# Patient Record
(demographics unavailable — no encounter records)

---

## 2025-01-02 NOTE — HISTORY OF PRESENT ILLNESS
[de-identified] : 62M with chronic R shoulder pain and neck pain presents for evaluation of rotator cuff tear. Pt is LHD but notes his R shoulder has been hurting him much more severely over the past year. Symptoms have been ongoing for over 3 years. He did PT for his shoulder last year but notes it did not help that much. MRI From 2023 showing partial thickness supraspinatus tear.

## 2025-01-02 NOTE — ASSESSMENT
[FreeTextEntry1] : 62M with R Rotator Cuff Tear, failed conservative management - Indicated for surgical fixation of rotator cuff tear - Risks and benefits of surgery discussed, including residual pain and stiffness as well as infection; pt agreed and willing to proceed with operation - Preoperative medical and cardiac clearance - Repeat MRI as his current MRI is > 1 year old - F/u with surgical coordinator; tenatively booked for Friday 1/17/25  All medical record entries made by "Selena Hammer" acting as a scribe for this encounter under the direction of "Dr. Serrato." I have reviewed the chart and agree that the record accurately reflects my personal performance of the history, physical exam, assessment and plan. I have also personally directed, reviewed and agreed with the chart.

## 2025-01-02 NOTE — PHYSICAL EXAM
[de-identified] : R Shoulder Full passive and AROM but with pain and terminal motion +Castillo impingement sign +Drop can test Neg load shift Neg speeds Neg Yeargasons 4/5 weakness with subscapularis and supraspinatus [de-identified] : MRI from 2023 showing partial thickness supraspinatus and infraspinatus tear with large subacromial osteophyte

## 2025-02-07 NOTE — HISTORY OF PRESENT ILLNESS
[Chills] : no chills [Constipation] : no constipation [Diarrhea] : no diarrhea [Dysuria] : no dysuria [Fever] : no fever [Nausea] : no nausea [Vomiting] : no vomiting [de-identified] : s/p Right shoulder arthroscopy with RCR and biceps tenodesis. (1/24/25) [de-identified] : 62 year old male s/p Right shoulder arthroscopy with RCR (1/24/25). Pain controlled with medication. Accompanied by wife.   [de-identified] : RUE: sutures removed, incisions well healed SILT m/r/u +motor EPL/FPL/EDC/FDP/IO WWP distally [de-identified] : 62 year old male s/p Right shoulder arthroscopy with RCR (1/24/25).  [de-identified] : Patient progressing well.  Discussed post-op restrictions. Given HEP.  The patient was given a referral for physical therapy.  F/U 4 weeks. We discussed red flag symptoms that would require emergent evaluation. He knows to call with any questions or concerns or if his symptoms acutely worsen.

## 2025-02-07 NOTE — HISTORY OF PRESENT ILLNESS
[Chills] : no chills [Constipation] : no constipation [Diarrhea] : no diarrhea [Dysuria] : no dysuria [Fever] : no fever [Nausea] : no nausea [Vomiting] : no vomiting [de-identified] : s/p Right shoulder arthroscopy with RCR and biceps tenodesis. (1/24/25) [de-identified] : 62 year old male s/p Right shoulder arthroscopy with RCR (1/24/25). Pain controlled with medication. Accompanied by wife.   [de-identified] : RUE: sutures removed, incisions well healed SILT m/r/u +motor EPL/FPL/EDC/FDP/IO WWP distally [de-identified] : 62 year old male s/p Right shoulder arthroscopy with RCR (1/24/25).  [de-identified] : Patient progressing well.  Discussed post-op restrictions. Given HEP.  The patient was given a referral for physical therapy.  F/U 4 weeks. We discussed red flag symptoms that would require emergent evaluation. He knows to call with any questions or concerns or if his symptoms acutely worsen.

## 2025-03-07 NOTE — HISTORY OF PRESENT ILLNESS
[___ Weeks Post Op] : [unfilled] weeks post op [7] : the patient reports pain that is 7/10 in severity [Chills] : chills [Constipation] : no constipation [Diarrhea] : no diarrhea [Dysuria] : no dysuria [Fever] : no fever [Nausea] : no nausea [Vomiting] : no vomiting

## 2025-05-02 NOTE — END OF VISIT
[FreeTextEntry3] :  I, Dr. Serrato personally performed the evaluation and management services for this established patient who presents today with (a) new problem(s)/exacerbation of (an) existing condition(s). That E/M includes conducting the clinically appropriate interval history &/or exam, assessing all new/exacerbated conditions, and establishing a new plan of care. Today, my PASCUAL, Renetta Melendrez was here to observe my evaluation and management service for this new problem/exacerbated condition and follow the plan of care established by me going forward.

## 2025-05-02 NOTE — HISTORY OF PRESENT ILLNESS
[Chills] : no chills [Constipation] : no constipation [Diarrhea] : no diarrhea [Dysuria] : no dysuria [Fever] : no fever [Nausea] : no nausea [Vomiting] : no vomiting [de-identified] : s/p Right shoulder arthroscopy with RCR and biceps tenodesis. (1/24/25). [de-identified] : 62 year old male s/p Right shoulder arthroscopy with RCR (1/24/25). Patient continues to improve. He is doing PT which has been helpful. He feels he's getting stronger. Accompanied by wife. Current symptoms include no chills, no constipation, no diarrhea, no dysuria, no fever, no nausea and no vomiting. [de-identified] : General: No acute distress, conversant, well-nourished. Head: Normocephalic, atraumatic Neck: trachea midline, FROM Heart: normotensive and normal rate and rhythm Lungs: No labored breathing Skin: No abrasions, no rashes, no edema Psych: Alert and oriented to person, place and time Extremities: no peripheral edema or digital cyanosis Gait: Normal gait. Can perform tandem gait.   Vascular: warm and well perfused distally, palpable distal pulses RUE: incisions well healed SILT m/r/u +motor EPL/FPL/EDC/FDP/IO WWP distally. [de-identified] : s/p Right shoulder arthroscopy with RCR and biceps tenodesis. (1/24/25). [de-identified] : The patient was given a referral for physical therapy. Continue PT. F/U in 2-3 months. We discussed red flag symptoms that would require emergent evaluation.  He knows to call with any questions or concerns or if his symptoms acutely worsen.